# Patient Record
Sex: MALE | Race: WHITE | NOT HISPANIC OR LATINO | Employment: UNEMPLOYED | URBAN - METROPOLITAN AREA
[De-identification: names, ages, dates, MRNs, and addresses within clinical notes are randomized per-mention and may not be internally consistent; named-entity substitution may affect disease eponyms.]

---

## 2020-01-21 ENCOUNTER — HOSPITAL ENCOUNTER (EMERGENCY)
Facility: HOSPITAL | Age: 20
Discharge: PSYCHIATRIC OR SUBSTANCE USE FACILITY | End: 2020-01-21
Attending: EMERGENCY MEDICINE
Payer: MEDICAID

## 2020-01-21 VITALS
HEART RATE: 60 BPM | BODY MASS INDEX: 41.98 KG/M2 | WEIGHT: 277 LBS | DIASTOLIC BLOOD PRESSURE: 69 MMHG | HEIGHT: 68 IN | TEMPERATURE: 98 F | OXYGEN SATURATION: 100 % | SYSTOLIC BLOOD PRESSURE: 128 MMHG | RESPIRATION RATE: 18 BRPM

## 2020-01-21 DIAGNOSIS — R45.851 DEPRESSION WITH SUICIDAL IDEATION: ICD-10-CM

## 2020-01-21 DIAGNOSIS — Z86.59 HISTORY OF ASPERGER'S SYNDROME: ICD-10-CM

## 2020-01-21 DIAGNOSIS — F32.A DEPRESSION WITH SUICIDAL IDEATION: ICD-10-CM

## 2020-01-21 DIAGNOSIS — R45.851 SUICIDAL IDEATION: Primary | ICD-10-CM

## 2020-01-21 DIAGNOSIS — Z86.59 HISTORY OF ADHD: ICD-10-CM

## 2020-01-21 PROBLEM — F32.9 MAJOR DEPRESSIVE DISORDER WITH CURRENT ACTIVE EPISODE: Status: ACTIVE | Noted: 2020-01-21

## 2020-01-21 LAB
ALBUMIN SERPL BCP-MCNC: 4.3 G/DL (ref 3.5–5.2)
ALP SERPL-CCNC: 60 U/L (ref 55–135)
ALT SERPL W/O P-5'-P-CCNC: 29 U/L (ref 10–44)
AMPHET+METHAMPHET UR QL: ABNORMAL
ANION GAP SERPL CALC-SCNC: 15 MMOL/L (ref 8–16)
APAP SERPL-MCNC: <10 UG/ML (ref 10–20)
AST SERPL-CCNC: 23 U/L (ref 10–40)
BARBITURATES UR QL SCN>200 NG/ML: NEGATIVE
BASOPHILS # BLD AUTO: 0.04 K/UL (ref 0–0.2)
BASOPHILS NFR BLD: 0.4 % (ref 0–1.9)
BENZODIAZ UR QL SCN>200 NG/ML: NEGATIVE
BILIRUB SERPL-MCNC: 0.5 MG/DL (ref 0.1–1)
BILIRUB UR QL STRIP: ABNORMAL
BUN SERPL-MCNC: 13 MG/DL (ref 6–20)
BZE UR QL SCN: NEGATIVE
CALCIUM SERPL-MCNC: 9.9 MG/DL (ref 8.7–10.5)
CANNABINOIDS UR QL SCN: NEGATIVE
CHLORIDE SERPL-SCNC: 106 MMOL/L (ref 95–110)
CLARITY UR: CLEAR
CO2 SERPL-SCNC: 24 MMOL/L (ref 23–29)
COLOR UR: YELLOW
CREAT SERPL-MCNC: 1.1 MG/DL (ref 0.5–1.4)
CREAT UR-MCNC: 561 MG/DL (ref 23–375)
DIFFERENTIAL METHOD: ABNORMAL
EOSINOPHIL # BLD AUTO: 0.1 K/UL (ref 0–0.5)
EOSINOPHIL NFR BLD: 1.4 % (ref 0–8)
ERYTHROCYTE [DISTWIDTH] IN BLOOD BY AUTOMATED COUNT: 12.9 % (ref 11.5–14.5)
EST. GFR  (AFRICAN AMERICAN): >60 ML/MIN/1.73 M^2
EST. GFR  (NON AFRICAN AMERICAN): >60 ML/MIN/1.73 M^2
ETHANOL SERPL-MCNC: <5 MG/DL
GLUCOSE SERPL-MCNC: 97 MG/DL (ref 70–110)
GLUCOSE UR QL STRIP: NEGATIVE
HCT VFR BLD AUTO: 43 % (ref 40–54)
HGB BLD-MCNC: 14.2 G/DL (ref 14–18)
HGB UR QL STRIP: NEGATIVE
IMM GRANULOCYTES # BLD AUTO: 0.02 K/UL (ref 0–0.04)
IMM GRANULOCYTES NFR BLD AUTO: 0.2 % (ref 0–0.5)
KETONES UR QL STRIP: NEGATIVE
LEUKOCYTE ESTERASE UR QL STRIP: NEGATIVE
LYMPHOCYTES # BLD AUTO: 3.2 K/UL (ref 1–4.8)
LYMPHOCYTES NFR BLD: 32.8 % (ref 18–48)
MCH RBC QN AUTO: 26.9 PG (ref 27–31)
MCHC RBC AUTO-ENTMCNC: 33 G/DL (ref 32–36)
MCV RBC AUTO: 81 FL (ref 82–98)
MONOCYTES # BLD AUTO: 0.8 K/UL (ref 0.3–1)
MONOCYTES NFR BLD: 8.6 % (ref 4–15)
NEUTROPHILS # BLD AUTO: 5.5 K/UL (ref 1.8–7.7)
NEUTROPHILS NFR BLD: 56.6 % (ref 38–73)
NITRITE UR QL STRIP: NEGATIVE
NRBC BLD-RTO: 0 /100 WBC
OPIATES UR QL SCN: NEGATIVE
PCP UR QL SCN>25 NG/ML: NEGATIVE
PH UR STRIP: 6 [PH] (ref 5–8)
PLATELET # BLD AUTO: 254 K/UL (ref 150–350)
PMV BLD AUTO: 8.7 FL (ref 9.2–12.9)
POTASSIUM SERPL-SCNC: 3.7 MMOL/L (ref 3.5–5.1)
PROT SERPL-MCNC: 7.3 G/DL (ref 6–8.4)
PROT UR QL STRIP: NEGATIVE
RBC # BLD AUTO: 5.28 M/UL (ref 4.6–6.2)
SODIUM SERPL-SCNC: 145 MMOL/L (ref 136–145)
SP GR UR STRIP: >=1.03 (ref 1–1.03)
TOXICOLOGY INFORMATION: ABNORMAL
TSH SERPL DL<=0.005 MIU/L-ACNC: 2.59 UIU/ML (ref 0.34–5.6)
URN SPEC COLLECT METH UR: ABNORMAL
UROBILINOGEN UR STRIP-ACNC: NEGATIVE EU/DL
WBC # BLD AUTO: 9.79 K/UL (ref 3.9–12.7)

## 2020-01-21 PROCEDURE — 80307 DRUG TEST PRSMV CHEM ANLYZR: CPT

## 2020-01-21 PROCEDURE — 99282 EMERGENCY DEPT VISIT SF MDM: CPT | Mod: 95,AF,HA,S$PBB | Performed by: PSYCHIATRY & NEUROLOGY

## 2020-01-21 PROCEDURE — 80053 COMPREHEN METABOLIC PANEL: CPT

## 2020-01-21 PROCEDURE — 99282 PR EMERGENCY DEPT VISIT,LEVEL II: ICD-10-PCS | Mod: 95,AF,HA,S$PBB | Performed by: PSYCHIATRY & NEUROLOGY

## 2020-01-21 PROCEDURE — 80320 DRUG SCREEN QUANTALCOHOLS: CPT

## 2020-01-21 PROCEDURE — 99285 EMERGENCY DEPT VISIT HI MDM: CPT

## 2020-01-21 PROCEDURE — 81003 URINALYSIS AUTO W/O SCOPE: CPT

## 2020-01-21 PROCEDURE — 85025 COMPLETE CBC W/AUTO DIFF WBC: CPT

## 2020-01-21 PROCEDURE — 84443 ASSAY THYROID STIM HORMONE: CPT

## 2020-01-21 NOTE — ED NOTES
CALL FROM [facility] AT [facility] AND STATES HE WILL LET PLACEMENT KNOW THEY ARE GOING TO ACCEPT THE PT, BUT THEY ARE FULL.  WILL SOON BE DISCHARGING AND NEED THE ED TO HOLD THE PT/TRANSPORT UNTIL [id number].  QUESTIONS ANSWERED AND REQUEST FOR NEW BP READING/VITAL SIGNS.

## 2020-01-21 NOTE — CONSULTS
"[facility]  Psychiatry  Telepsychiatry Consult Note    Please see previous notes: no prior psychiatric notes found in chart     Patient agreeable to consultation via telepsychiatry.    Tele-Consultation from Psychiatry started: 1/21/2020 at 4:50 AM  The chief complaint leading to psychiatric consultation is: SI and Depression   This consultation was requested by Dr. Rizo, the Emergency Department attending physician.  The location of the consulting psychiatrist is [place], [place]  The patient location is  [facility] [place] EMERGENCY DEPARTMENT   The patient arrived at the ED at: unknown     Also present with the patient at the time of the consultation:  tech/nurse     Patient Identification:   Spike Díaz is a 19 y.o. male.    Patient information was obtained from patient, past medical records and ED MD.  Patient presented voluntarily to the Emergency Department ambulatory.    Inpatient consult to Psychiatry  Consult performed by: Wolf Leigh MD  Consult ordered by: Ольга Rizo MD        Subjective:     Per ED MD:  Chief Complaint   Patient presents with    Suicidal       "wanting to hurt myself"    Chief complaint is suicidal thoughts.  The patient is under lot of stress and around 10:00 a.m. last night while getting ready and getting dressed working confined his socks and that was the last straw.  He started crying became upset.  He then told his boss and he was brought here for evaluation.  He does state that he seen a psychiatrist twice and consult once and is on Vyvanse has been on for 2-3 weeks.  Thinks it might be helping slightly.  He did have thoughts of hurting himself tonight.  He has no plan at this point in  time.  He wanted to catch it before it  got worse    Per ED RN:  PT REPORTS AT 10 PM LAST NIGHT HE WAS UNABLE TO FIND SOCKS, AT WHICH TIME HE HAD A "BREAKDOWN" WITH LOTS OF CRYING AND ANGER.  IT WAS AT THAT TIME HE BEGAN TO HAVE SI.  STATES HE HAS NEVER HAD THOUGHTS OF SUICIDE " "BEFORE THIS INCIDENT.  PT STATES HE HAS SOME "FAMILY THINGS" GOING ON AND IS LIVING WITH HIS AUNT SINCE July.  STATES HE HAS SEEN A PSYCHIATRIST TWICE, HE WAS Rx VYVANCE AND HAS BEEN TAKING IN 2-3 WEEKS FOR ADHD.  PT REPORTS HARD TO FOCUS OR CONCENTRATE AND BECAME MORE FORGETFUL.  PT STATES THE PSYCHIATRIST ASKED IF HE NEEDED TO SEE A COUNSELOR.  HE STATES HE DID NOT AT FIRST BUT THEN CALLED BACK AND SAID HE DID.  PSYCHIATRIST ARRANGE FOR HIM TO MEET WITH COUNSELOR.  PT STATES HE HAS NO SPECIFIC PLAN BUT THOUGHT IS JUST "LINGERING".  PT IS CURRENTLY A LITTLE ANXIOUS, WITH FASTER SPEECH, BUT COOPERATIVE.     History of Present Illness:  Patient seen and chart reviewed.  Patient is a 19 year old male with a past psychiatric history of Asperger Syndrome and ADHD who presented to the ED complaining of worsening depression over the past few months culminating in current passive SI than began last night around 10 PM. He states that his depression has been complicated by family stressors and increased self-criticism.  He endorses intermittent low mood, low appetite, low energy, low motivation, anhedonia, poor concentration, with most recently increased feelings of guilt, hopelessness, and passive SI (denies plan; "I've been trying to keep myself from thinking of ways to do it").  He denies any current or prior HI/AH/VH/delusions.  He denies any current or prior signs/symptoms consistent with cornelio, psychosis, anxiety, panic, or PTSD.  He did not appear overtly psychotic or manic during the examination.  He denies any current medical complaints or medication AEs.  He denies any hx of drug, alcohol, or tobacco use.  He endorses sleeping 6-8 hours per night complicated by doing night shift work at [employer].  He endorses notable concern over these thoughts of suicide becoming increasingly intrusive beginning last night.      Psychiatric History:   Previous Psychiatric Hospitalizations: denies   Previous Medication Trials: Yes, " "currently on Vyvanse; denies other trials other than other unknown ADHD meds  Previous Suicide Attempts: denies   History of Violence: denies  History of Depression: Yes, as noted above in HPI  History of Berna: denies  History of Auditory/Visual Hallucination: denies  History of Delusions: denies  Outpatient psychiatrist (current & past): Yes, currently sees Dr. Junior Bettencourt "for Asperger Syndrome and ADHD; I've seen him twice"    Substance Abuse History:  Tobacco: denies  Alcohol: denies  Illicit Substances: denies  Detox/Rehab: denies    Legal History: Past charges/incarcerations: denies     Family Psychiatric History: mother with unknown psychiatric illness     Social History:  Developmental/Childhood:Achieved all developmental milestones timely  Education:High School Diploma; denies special education   Employment Status/Finances:Employed at [employer]   Relationship Status/Sexual Orientation: Single  Children: 0  Housing Status: Home with aunt and cousins in [place], [place]   history:  denies  Access to gun: denies  Mandaeism:Non-practicing  Recreational activities: "play video games"    Psychiatric Mental Status Exam:  Arousal: alert  Sensorium/Orientation: oriented to person, place, situation, day of week, month of year, year  Behavior/Cooperation: cooperative, eye contact intermittent    Speech: normal tone, normal rate, normal pitch, normal volume  Language: grossly intact, able to name, able to repeat  Mood: " depressed "   Affect: constricted  Thought Process: linear  Thought Content:   Auditory hallucinations: NO  Visual hallucinations: NO  Paranoia: NO  Delusions:  NO  Suicidal ideation: YES: as noted in HPI     Homicidal ideation: NO  Attention/Concentration:  spelled "WORLD" forwards and backwards  Memory:    Recent:  Intact   Remote: Intact   3/3 immediate, 2/3 at 5 min  Fund of Knowledge: Aware of current events and Vocabulary appropriate    Abstract reasoning: similarities were " abstract  Insight: limited awareness of illness  Judgment: limited    Vitals:    01/21/20 0349   BP: (!) 142/79   Pulse: 90   Resp: 16   Temp: 98.3 °F (36.8 °C)     Past Medical History: No past medical history on file.   Laboratory Data:   Labs Reviewed   CBC W/ AUTO DIFFERENTIAL - Abnormal; Notable for the following components:       Result Value    Mean Corpuscular Volume 81 (*)     Mean Corpuscular Hemoglobin 26.9 (*)     MPV 8.7 (*)     All other components within normal limits   URINALYSIS, REFLEX TO URINE CULTURE - Abnormal; Notable for the following components:    Specific Gravity, UA >=1.030 (*)     Bilirubin (UA) 1+ (*)     All other components within normal limits    Narrative:     Preferred Collection Type->Urine, Clean Catch  Specimen Source->Urine   COMPREHENSIVE METABOLIC PANEL   DRUG SCREEN PANEL, URINE EMERGENCY    Narrative:     Preferred Collection Type->Urine, Clean Catch  Specimen Source->Urine   TSH   ALCOHOL,MEDICAL (ETHANOL)   ACETAMINOPHEN LEVEL     Neurological History:  Seizures: denies  Head trauma: denies    Allergies: as noted below  Review of patient's allergies indicates:  No Known Allergies    Medications in ER: Medications - No data to display    Medications at home: Vyvanse 20mg PO daily     No new subjective & objective note has been filed under this hospital service since the last note was generated.      Assessment - Diagnosis - Goals:     Diagnosis/Impression:   Unspecified Depressive Disorder with SI  Hx of Asperger Syndrome  Hx of ADHD    Rec:   - Once medically cleared, continue PEC/CEC for threat to self in the context of current psychiatric s/s and seek inpatient psychiatric admission for treatment/stabilization.      - Defer scheduled psychiatric medications to the inpatient treatment team; defer non-psychiatric meds to ED MD.     - Zyprexa 5 mg PO/IM q6 hours PRN for non-redirectable psychotic/manic agitation (do not give within one hour of a benzodiazepine  medication).     - Maintain suicide precautions.     - Continue to monitor patient until inpatient psychiatric placement is achieved     Time with patient: 38 minutes     More than 50% of the time was spent counseling/coordinating care    Consulting clinician was informed of the encounter and consult note.    Consultation ended: 1/21/2020 at 5:28 AM    Wolf Leigh MD   Psychiatry  [facility]

## 2020-01-21 NOTE — ED NOTES
"PT REPORTS AT 10 PM LAST NIGHT HE WAS UNABLE TO FIND SOCKS, AT WHICH TIME HE HAD A "BREAKDOWN" WITH LOTS OF CRYING AND ANGER.  IT WAS AT THAT TIME HE BEGAN TO HAVE SI.  STATES HE HAS NEVER HAD THOUGHTS OF SUICIDE BEFORE THIS INCIDENT.  PT STATES HE HAS SOME "FAMILY THINGS" GOING ON AND IS LIVING WITH HIS AUNT SINCE July.  STATES HE HAS SEEN A PSYCHIATRIST TWICE, HE WAS Rx VYVANCE AND HAS BEEN TAKING IN 2-3 WEEKS FOR ADHD.  PT REPORTS HARD TO FOCUS OR CONCENTRATE AND BECAME MORE FORGETFUL.  PT STATES THE PSYCHIATRIST ASKED IF HE NEEDED TO SEE A COUNSELOR.  HE STATES HE DID NOT AT FIRST BUT THEN CALLED BACK AND SAID HE DID.  PSYCHIATRIST ARRANGE FOR HIM TO MEET WITH COUNSELOR.  PT STATES HE HAS NO SPECIFIC PLAN BUT THOUGHT IS JUST "LINGERING".  PT IS CURRENTLY A LITTLE ANXIOUS, WITH FASTER SPEECH, BUT COOPERATIVE.   "

## 2020-01-21 NOTE — ED PROVIDER NOTES
"Encounter Date: 1/21/2020       History     Chief Complaint   Patient presents with    Suicidal     "wanting to hurt myself"      Chief complaint is suicidal thoughts.  The patient is under lot of stress and around 10:00 a.m. last night while getting ready and getting dressed working confined his socks and that was the last straw.  He started crying became upset.  He then told his boss and he was brought here for evaluation.  He does state that he seen a psychiatrist twice and consult once and is on Vyvanse has been on for 2-3 weeks.  Thinks it might be helping slightly.  He did have thoughts of hurting himself tonight.  He has no plan at this point in  time.  He wanted to catch it before it  got worse        Review of patient's allergies indicates:  No Known Allergies  No past medical history on file.  No past surgical history on file.  No family history on file.  Social History     Tobacco Use    Smoking status: Not on file   Substance Use Topics    Alcohol use: Not on file    Drug use: Not on file     Review of Systems   Constitutional: Negative for chills and fever.   HENT: Negative for ear pain, rhinorrhea and sore throat.    Eyes: Negative for pain and visual disturbance.   Respiratory: Negative for cough and shortness of breath.    Cardiovascular: Negative for chest pain and palpitations.   Gastrointestinal: Negative for abdominal pain, constipation, diarrhea, nausea and vomiting.   Genitourinary: Negative for dysuria, frequency, hematuria and urgency.   Musculoskeletal: Negative for back pain, joint swelling and myalgias.   Skin: Negative for rash.   Neurological: Negative for dizziness, seizures, weakness and headaches.   Psychiatric/Behavioral: Positive for suicidal ideas. Negative for dysphoric mood. The patient is not nervous/anxious.        Physical Exam     Initial Vitals [01/21/20 0349]   BP Pulse Resp Temp SpO2   (!) 142/79 90 16 98.3 °F (36.8 °C) 99 %      MAP       --         Physical " Exam    Nursing note and vitals reviewed.  Constitutional: He appears well-developed and well-nourished.   HENT:   Head: Normocephalic and atraumatic.   Eyes: Conjunctivae, EOM and lids are normal. Pupils are equal, round, and reactive to light.   Neck: Trachea normal. Neck supple. No thyroid mass present.   Cardiovascular: Normal rate, regular rhythm and normal heart sounds.   Pulmonary/Chest: Breath sounds normal. No respiratory distress.   Abdominal: Soft. Bowel sounds are normal. There is no tenderness.   Musculoskeletal: Normal range of motion.   Neurological: He is alert and oriented to person, place, and time. He has normal strength and normal reflexes. No cranial nerve deficit or sensory deficit.   Skin: Skin is warm and dry.   Psychiatric: He has a normal mood and affect. His speech is normal and behavior is normal. Judgment and thought content normal.   Calm cooperative         ED Course   Procedures  Labs Reviewed - No data to display       Imaging Results    None                       Attending Attestation:             Attending ED Notes:   Patient is medically clear seen by psych Dr. Leigh who request to admit the patient                        Clinical Impression:       ICD-10-CM ICD-9-CM   1. Suicidal ideation R45.851 V62.84   2. Depression with suicidal ideation F32.9 311    R45.851 V62.84   3. History of ADHD Z86.59 V11.8   4. History of Asperger's syndrome Z86.59 V11.8                             Ольга Rizo MD  01/23/20 0205

## 2020-01-22 PROBLEM — Z13.9 ENCOUNTER FOR MEDICAL SCREENING EXAMINATION: Status: ACTIVE | Noted: 2020-01-22

## 2020-04-27 PROBLEM — Z13.9 ENCOUNTER FOR MEDICAL SCREENING EXAMINATION: Status: RESOLVED | Noted: 2020-01-22 | Resolved: 2020-04-27
